# Patient Record
Sex: FEMALE | Race: WHITE | NOT HISPANIC OR LATINO | Employment: FULL TIME | URBAN - METROPOLITAN AREA
[De-identification: names, ages, dates, MRNs, and addresses within clinical notes are randomized per-mention and may not be internally consistent; named-entity substitution may affect disease eponyms.]

---

## 2017-04-23 LAB
FLUAV AG NPH QL: NEGATIVE
FLUBV AG NPH QL IA: NEGATIVE

## 2017-04-23 PROCEDURE — 87804 INFLUENZA ASSAY W/OPTIC: CPT | Performed by: EMERGENCY MEDICINE

## 2017-04-23 PROCEDURE — 99283 EMERGENCY DEPT VISIT LOW MDM: CPT

## 2017-04-24 ENCOUNTER — HOSPITAL ENCOUNTER (EMERGENCY)
Facility: HOSPITAL | Age: 27
Discharge: HOME | End: 2017-04-24
Attending: EMERGENCY MEDICINE | Admitting: EMERGENCY MEDICINE

## 2017-04-24 VITALS
DIASTOLIC BLOOD PRESSURE: 66 MMHG | RESPIRATION RATE: 16 BRPM | OXYGEN SATURATION: 99 % | TEMPERATURE: 98.3 F | BODY MASS INDEX: 42.33 KG/M2 | WEIGHT: 230 LBS | HEIGHT: 62 IN | SYSTOLIC BLOOD PRESSURE: 106 MMHG | HEART RATE: 77 BPM

## 2017-04-24 DIAGNOSIS — B34.9 VIRAL ILLNESS: Primary | ICD-10-CM

## 2017-04-24 LAB
ANION GAP SERPL CALCULATED.3IONS-SCNC: 12.7 MMOL/L
BASOPHILS # BLD AUTO: 0.03 10*3/MM3 (ref 0–0.2)
BASOPHILS NFR BLD AUTO: 0.3 % (ref 0–1.5)
BUN BLD-MCNC: 10 MG/DL (ref 6–20)
BUN/CREAT SERPL: 14.7 (ref 7–25)
CALCIUM SPEC-SCNC: 8.8 MG/DL (ref 8.6–10.5)
CHLORIDE SERPL-SCNC: 104 MMOL/L (ref 98–107)
CO2 SERPL-SCNC: 23.3 MMOL/L (ref 22–29)
CREAT BLD-MCNC: 0.68 MG/DL (ref 0.57–1)
DEPRECATED RDW RBC AUTO: 43 FL (ref 37–54)
EOSINOPHIL # BLD AUTO: 0.14 10*3/MM3 (ref 0–0.7)
EOSINOPHIL NFR BLD AUTO: 1.4 % (ref 0.3–6.2)
ERYTHROCYTE [DISTWIDTH] IN BLOOD BY AUTOMATED COUNT: 13.9 % (ref 11.7–13)
GFR SERPL CREATININE-BSD FRML MDRD: 106 ML/MIN/1.73
GLUCOSE BLD-MCNC: 90 MG/DL (ref 65–99)
HCT VFR BLD AUTO: 37.3 % (ref 35.6–45.5)
HGB BLD-MCNC: 12.3 G/DL (ref 11.9–15.5)
IMM GRANULOCYTES # BLD: 0.03 10*3/MM3 (ref 0–0.03)
IMM GRANULOCYTES NFR BLD: 0.3 % (ref 0–0.5)
LYMPHOCYTES # BLD AUTO: 3.76 10*3/MM3 (ref 0.9–4.8)
LYMPHOCYTES NFR BLD AUTO: 36.8 % (ref 19.6–45.3)
MCH RBC QN AUTO: 27.9 PG (ref 26.9–32)
MCHC RBC AUTO-ENTMCNC: 33 G/DL (ref 32.4–36.3)
MCV RBC AUTO: 84.6 FL (ref 80.5–98.2)
MONOCYTES # BLD AUTO: 0.66 10*3/MM3 (ref 0.2–1.2)
MONOCYTES NFR BLD AUTO: 6.5 % (ref 5–12)
NEUTROPHILS # BLD AUTO: 5.6 10*3/MM3 (ref 1.9–8.1)
NEUTROPHILS NFR BLD AUTO: 54.7 % (ref 42.7–76)
PLATELET # BLD AUTO: 347 10*3/MM3 (ref 140–500)
PMV BLD AUTO: 10.8 FL (ref 6–12)
POTASSIUM BLD-SCNC: 3.7 MMOL/L (ref 3.5–5.2)
RBC # BLD AUTO: 4.41 10*6/MM3 (ref 3.9–5.2)
SODIUM BLD-SCNC: 140 MMOL/L (ref 136–145)
WBC NRBC COR # BLD: 10.22 10*3/MM3 (ref 4.5–10.7)

## 2017-04-24 PROCEDURE — 80048 BASIC METABOLIC PNL TOTAL CA: CPT | Performed by: EMERGENCY MEDICINE

## 2017-04-24 PROCEDURE — 85025 COMPLETE CBC W/AUTO DIFF WBC: CPT | Performed by: EMERGENCY MEDICINE

## 2017-04-24 NOTE — ED PROVIDER NOTES
EMERGENCY DEPARTMENT ENCOUNTER    CHIEF COMPLAINT  Chief Complaint: generalized myalgias  History given by: patient, family  History limited by: nothing  Room Number: 22/22  PMD: RY Mcgee      HPI:  Pt is a 26 y.o. female who presents complaining of generalized myalgias and chills which began earlier tonight. Pt also complains of bilateral ear discomfort, low grade temperature (TMax=99 degrees), post-nasal drip, mild HA, sinus pressure and rhinorrhea but denies urinary symptoms, cough, abd pain or sore throat. Pt states that her coworker had strep throat and bronchitis recently. Pt denies any chance of pregnancy and states that she is on her menses at this time.    Duration:  Several hours  Onset: gradual  Timing: constant  Location: generalized  Radiation: none  Quality: myalgias  Intensity/Severity: moderate  Progression: worsening  Associated Symptoms: chills, bilateral ear discomfort, low grade temperature, post-nasal drip, HA, sinus pressure and rhinorrhea  Aggravating Factors: none  Alleviating Factors: none  Previous Episodes: none  Treatment before arrival: none    PAST MEDICAL HISTORY  Active Ambulatory Problems     Diagnosis Date Noted   • No Active Ambulatory Problems     Resolved Ambulatory Problems     Diagnosis Date Noted   • No Resolved Ambulatory Problems     Past Medical History:   Diagnosis Date   • Bulging lumbar disc    • UTI (urinary tract infection)        PAST SURGICAL HISTORY  Past Surgical History:   Procedure Laterality Date   • TONSILLECTOMY         FAMILY HISTORY  History reviewed. No pertinent family history.    SOCIAL HISTORY  Social History     Social History   • Marital status: Single     Spouse name: N/A   • Number of children: N/A   • Years of education: N/A     Occupational History   • Not on file.     Social History Main Topics   • Smoking status: Never Smoker   • Smokeless tobacco: Not on file   • Alcohol use No   • Drug use: No   • Sexual activity: Defer     Other  Topics Concern   • Not on file     Social History Narrative       ALLERGIES  Review of patient's allergies indicates no known allergies.    REVIEW OF SYSTEMS  Review of Systems   Constitutional: Positive for chills and fever (TMax=99 degrees).   HENT: Positive for ear pain (bilateral), postnasal drip, rhinorrhea and sinus pressure. Negative for sore throat.    Eyes: Negative.    Respiratory: Negative for cough and shortness of breath.    Cardiovascular: Negative for chest pain.   Gastrointestinal: Negative for abdominal pain, diarrhea and vomiting.   Genitourinary: Negative for dysuria.   Musculoskeletal: Positive for myalgias (generalized). Negative for neck pain.   Skin: Negative for rash.   Allergic/Immunologic: Negative.    Neurological: Positive for headaches (mild). Negative for weakness and numbness.   Hematological: Negative.    Psychiatric/Behavioral: Negative.    All other systems reviewed and are negative.      PHYSICAL EXAM  ED Triage Vitals   Temp Heart Rate Resp BP SpO2   04/23/17 2254 04/23/17 2254 04/23/17 2254 04/23/17 2304 04/23/17 2254   98.3 °F (36.8 °C) 102 16 139/91 98 %      Temp src Heart Rate Source Patient Position BP Location FiO2 (%)   04/23/17 2254 04/23/17 2254 04/23/17 2304 -- --   Tympanic Monitor Sitting         Physical Exam   Constitutional: She is oriented to person, place, and time and well-developed, well-nourished, and in no distress. She does not have a sickly appearance. No distress.   HENT:   Head: Normocephalic and atraumatic.   Right Ear: Tympanic membrane normal.   Left Ear: Tympanic membrane normal.   Nose: Rhinorrhea (clear drainage) present.   Mouth/Throat: Oropharynx is clear and moist. No posterior oropharyngeal erythema.   Eyes: EOM are normal. Pupils are equal, round, and reactive to light.   Neck: Normal range of motion. Neck supple.   Cardiovascular: Normal rate, regular rhythm and normal heart sounds.    Pulmonary/Chest: Effort normal and breath sounds normal.  No respiratory distress.   Abdominal: Soft. There is no tenderness. There is no rebound and no guarding.   obese   Musculoskeletal: Normal range of motion. She exhibits no edema.   Lymphadenopathy:     She has no cervical adenopathy.   Neurological: She is alert and oriented to person, place, and time. She has normal sensation and normal strength.   Skin: Skin is warm and dry. No rash noted.   Psychiatric: Mood and affect normal.   Nursing note and vitals reviewed.      LAB RESULTS  Lab Results (last 24 hours)     Procedure Component Value Units Date/Time    Influenza Antigen [[id number]]  (Normal) Collected:  04/23/17 2308    Specimen:  Swab from Nasopharynx Updated:  04/23/17 2349     Influenza A Ag, EIA Negative     Influenza B Ag, EIA Negative    CBC & Differential [[id number]] Collected:  04/24/17 0143    Specimen:  Blood Updated:  04/24/17 0158    Narrative:       The following orders were created for panel order CBC & Differential.  Procedure                               Abnormality         Status                     ---------                               -----------         ------                     CBC Auto Differential[[id number]]         Abnormal            Final result                 Please view results for these tests on the individual orders.    Basic Metabolic Panel [[id number]] Collected:  04/24/17 0143    Specimen:  Blood Updated:  04/24/17 0216     Glucose 90 mg/dL      BUN 10 mg/dL      Creatinine 0.68 mg/dL      Sodium 140 mmol/L      Potassium 3.7 mmol/L      Chloride 104 mmol/L      CO2 23.3 mmol/L      Calcium 8.8 mg/dL      eGFR Non African Amer 106 mL/min/1.73      BUN/Creatinine Ratio 14.7     Anion Gap 12.7 mmol/L     Narrative:       GFR Normal >60  Chronic Kidney Disease <60  Kidney Failure <15    CBC Auto Differential [[id number]]  (Abnormal) Collected:  04/24/17 0143    Specimen:  Blood Updated:  04/24/17 0158     WBC 10.22 10*3/mm3      RBC 4.41 10*6/mm3      Hemoglobin 12.3 g/dL       Hematocrit 37.3 %      MCV 84.6 fL      MCH 27.9 pg      MCHC 33.0 g/dL      RDW 13.9 (H) %      RDW-SD 43.0 fl      MPV 10.8 fL      Platelets 347 10*3/mm3      Neutrophil % 54.7 %      Lymphocyte % 36.8 %      Monocyte % 6.5 %      Eosinophil % 1.4 %      Basophil % 0.3 %      Immature Grans % 0.3 %      Neutrophils, Absolute 5.60 10*3/mm3      Lymphocytes, Absolute 3.76 10*3/mm3      Monocytes, Absolute 0.66 10*3/mm3      Eosinophils, Absolute 0.14 10*3/mm3      Basophils, Absolute 0.03 10*3/mm3      Immature Grans, Absolute 0.03 10*3/mm3           I ordered the above labs and reviewed the results    PROCEDURES  Procedures      PROGRESS AND CONSULTS  ED Course     0108- Notified pt and family of the pt's negative influenza screen. D/w pt and family that the pt most likely has a viral illness. Discussed the plan to order basic blood work prior to discharging the pt home. Pt and family agree with the plan and all questions were addressed.    0113- Ordered blood work for further evaluation.    0243- Rechecked pt. Pt is resting comfortably. Notified pt and family of the pt's unremarkable lab results. D/w pt and family that I believe that the pt has a viral illness and does not need abx at this time. Discussed the plan to discharge the pt home and RTER warning given for worsening symptoms. Pt and family agree with the plan and all questions were addressed.    2:47 AM  Latest vital signs   BP- 106/66 HR- 77 Temp- 98.3 °F (36.8 °C) (Tympanic) O2 sat- 99%    MEDICAL DECISION MAKING  Results were reviewed/discussed with the patient and they were also made aware of online access. Pt also made aware that follow up with PMD is necessary.     MDM  Number of Diagnoses or Management Options  Viral illness:      Amount and/or Complexity of Data Reviewed  Clinical lab tests: ordered and reviewed (Pt's influenza screen is negative. WBC=10.22)    Patient Progress  Patient progress: stable         DIAGNOSIS  Final diagnoses:   Viral  illness       DISPOSITION  DISCHARGE    Patient discharged in stable condition.    Reviewed implications of results, diagnosis, meds, responsibility to follow up, warning signs and symptoms of possible worsening, potential complications and reasons to return to ER, including fever, worsening pain or any concerns.    Patient/Family voiced understanding of above instructions.    Discussed plan for discharge, as there is no emergent indication for admission.  Pt/family is agreeable and understands need for follow up and repeat testing.  Pt is aware that discharge does not mean that nothing is wrong but it indicates no emergency is present that requires admission and they must continue care with follow-up as given below or physician of their choice.     FOLLOW-UP  Sammi Mike, APRN  [address]  [place] [id number] [phone]    Call  As needed, If symptoms worsen., Return if pain worsens, If symptoms worsen, shortness of breath, fever, any concerns    [facility] Emergency Department  [address] [facility] [address] [phone]  Go to  As needed, If symptoms worsen         Medication List      Stop          baclofen 10 MG tablet   Commonly known as:  LIORESAL       cephalexin 500 MG capsule   Commonly known as:  KEFLEX       chlorzoxazone 500 MG tablet   Commonly known as:  PARAFON FORTE       cyclobenzaprine 10 MG tablet   Commonly known as:  FLEXERIL       HYDROcodone-acetaminophen 5-325 MG per tablet   Commonly known as:  NORCO       ondansetron 4 MG tablet   Commonly known as:  ZOFRAN       predniSONE 50 MG tablet   Commonly known as:  DELTASONE               Latest Documented Vital Signs:  As of 2:49 AM  BP- 106/66 HR- 77 Temp- 98.3 °F (36.8 °C) (Tympanic) O2 sat- 99%    --  Documentation assistance provided by domingo Buchanan for Dr. Frank.  Information recorded by the domingo was done at my direction and has been verified and validated by me.      Paulina Buchanan  04/24/17 0249       Jaime Frank MD  04/24/17 2750

## 2017-04-24 NOTE — ED TRIAGE NOTES
Pt to ED for chills, body aches, earache onset this morning. Last dose of Ibuprofen 400mg at 9pm tonight

## 2018-07-03 ENCOUNTER — HOSPITAL ENCOUNTER (EMERGENCY)
Facility: HOSPITAL | Age: 28
Discharge: HOME | End: 2018-07-03
Attending: EMERGENCY MEDICINE | Admitting: EMERGENCY MEDICINE

## 2018-07-03 ENCOUNTER — APPOINTMENT (OUTPATIENT)
Dept: GENERAL RADIOLOGY | Facility: HOSPITAL | Age: 28
End: 2018-07-03

## 2018-07-03 VITALS
DIASTOLIC BLOOD PRESSURE: 80 MMHG | WEIGHT: 240 LBS | HEIGHT: 62 IN | HEART RATE: 76 BPM | BODY MASS INDEX: 44.16 KG/M2 | SYSTOLIC BLOOD PRESSURE: 122 MMHG | TEMPERATURE: 98.2 F | OXYGEN SATURATION: 99 % | RESPIRATION RATE: 18 BRPM

## 2018-07-03 DIAGNOSIS — S93.402A SPRAIN OF LEFT ANKLE, UNSPECIFIED LIGAMENT, INITIAL ENCOUNTER: Primary | ICD-10-CM

## 2018-07-03 PROCEDURE — 73610 X-RAY EXAM OF ANKLE: CPT

## 2018-07-03 PROCEDURE — 99283 EMERGENCY DEPT VISIT LOW MDM: CPT

## 2018-07-03 RX ORDER — HYDROCODONE BITARTRATE AND ACETAMINOPHEN 7.5; 325 MG/1; MG/1
1 TABLET ORAL ONCE
Status: COMPLETED | OUTPATIENT
Start: 2018-07-03 | End: 2018-07-03

## 2018-07-03 RX ADMIN — HYDROCODONE BITARTRATE AND ACETAMINOPHEN 1 TABLET: 7.5; 325 TABLET ORAL at 19:43

## 2018-07-03 NOTE — ED PROVIDER NOTES
" EMERGENCY DEPARTMENT ENCOUNTER    CHIEF COMPLAINT  Chief Complaint: L ankle pain  History given by: Pt  History limited by: nothing  Room Number: 34/34  PMD: RY Sim      HPI:  Pt is a 28 y.o. female who presents complaining of L ankle pain which began PTA when pt \"rolled\" her ankle. Pt states that she felt a pop when she accidentally fell down two steps. Pt also complains of abrasions to R knee denies a blow to the head, hip pain, and numbness or tingling. Pt is able to ambulate but states that she is having trouble bearing weight.     Duration:  PTA  Onset: sudden  Timing: constant  Location: L ankle  Radiation: none  Quality: pain  Intensity/Severity: mild  Progression: unchanged  Associated Symptoms: abrasions  Aggravating Factors: bearing weight  Alleviating Factors: none  Previous Episodes: none  Treatment before arrival: nothing    PAST MEDICAL HISTORY  Active Ambulatory Problems     Diagnosis Date Noted   • No Active Ambulatory Problems     Resolved Ambulatory Problems     Diagnosis Date Noted   • No Resolved Ambulatory Problems     Past Medical History:   Diagnosis Date   • Bulging lumbar disc    • UTI (urinary tract infection)        PAST SURGICAL HISTORY  Past Surgical History:   Procedure Laterality Date   • TONSILLECTOMY         FAMILY HISTORY  History reviewed. No pertinent family history.    SOCIAL HISTORY  Social History     Social History   • Marital status: Single     Spouse name: N/A   • Number of children: N/A   • Years of education: N/A     Occupational History   • Not on file.     Social History Main Topics   • Smoking status: Never Smoker   • Smokeless tobacco: Not on file   • Alcohol use No   • Drug use: No   • Sexual activity: Defer     Other Topics Concern   • Not on file     Social History Narrative   • No narrative on file       ALLERGIES  Patient has no known allergies.    REVIEW OF SYSTEMS  Review of Systems   Constitutional: Negative for fever.   Respiratory: Negative " for shortness of breath.    Cardiovascular: Negative for chest pain.   Musculoskeletal: Positive for arthralgias (L ankle).   Skin: Positive for wound (abrasions to R knee).   Neurological: Negative for numbness.       PHYSICAL EXAM  ED Triage Vitals [07/03/18 1902]   Temp Heart Rate Resp BP SpO2   97.2 °F (36.2 °C) 106 20 -- 98 %      Temp src Heart Rate Source Patient Position BP Location FiO2 (%)   Tympanic Monitor -- -- --       Physical Exam   Constitutional: She is oriented to person, place, and time. No distress.   Eyes: EOM are normal.   Neck: Normal range of motion.   Cardiovascular: Normal rate and regular rhythm.    Pulses:       Dorsalis pedis pulses are 2+ on the right side, and 2+ on the left side.   Pulmonary/Chest: Effort normal and breath sounds normal. No respiratory distress.   Musculoskeletal:        Right knee: She exhibits no bony tenderness.        Left ankle: She exhibits swelling (mild, lateral). She exhibits no deformity and normal pulse. Tenderness (mild, lateral).        Right foot: There is no bony tenderness and normal capillary refill.        Left foot: There is normal capillary refill.   Neurological: She is alert and oriented to person, place, and time. She has normal sensation and normal strength.   Skin: Skin is warm and dry. Abrasion (superficial to R knee, R second toe) noted.   Psychiatric: Affect normal.   Nursing note and vitals reviewed.        RADIOLOGY  XR Ankle 3+ View Left   Final Result       Soft tissue swelling. No acute fracture identified. If there is clinical   suspicion for radiographically occult fracture, or to further evaluate   the soft tissues, MRI could be considered.       This report was finalized on 7/3/2018 7:27 PM by Dr. Luke Woodson M.D.               I ordered the above noted radiological studies. Interpreted by radiologist. Reviewed by me in PACS.       PROCEDURES  Procedures  Splint Application:  Splint Type: L ankle aircast  Indication: L  ankle sprain  Splint placed by ERT  Post splint application:   1) neurovascularly intact   2) good position  Discussed splint care with patient  Discussed PMD/orthopedic follow up      PROGRESS AND CONSULTS     1910: Notified pt of plan to order imaging for further evaluation. Pt understands and agrees with the plan, all questions answered.    1912: Ordered XR L ankle for further evaluation.     1935: Pt rechecked. Pt resting comfortably and in no acute distress. Notified pt of unremarkable imaging results and possible strain. Notified pt of plan to have ERT place an aircast and give pt crutches for comfort. Instructed pt to f/u with ortho as needed. Pt understands and agrees with the plan, all questions answered.    MEDICAL DECISION MAKING  Results were reviewed/discussed with the patient and they were also made aware of online access. Pt also made aware that some labs, such as cultures, will not be resulted during ER visit and follow up with PMD is necessary.     MDM  Number of Diagnoses or Management Options     Amount and/or Complexity of Data Reviewed  Tests in the radiology section of CPT®: ordered and reviewed (XR L ankle is negative.)  Decide to obtain previous medical records or to obtain history from someone other than the patient: yes    Patient Progress  Patient progress: stable         DIAGNOSIS  Final diagnoses:   Sprain of left ankle, unspecified ligament, initial encounter       DISPOSITION  DISCHARGE    Patient discharged in stable condition.    Reviewed implications of results, diagnosis, meds, responsibility to follow up, warning signs and symptoms of possible worsening, potential complications and reasons to return to ER.    Patient/Family voiced understanding of above instructions.    Discussed plan for discharge, as there is no emergent indication for admission. Patient referred to primary care provider for BP management due to today's BP. Pt/family is agreeable and understands need for follow  up and repeat testing.  Pt is aware that discharge does not mean that nothing is wrong but it indicates no emergency is present that requires admission and they must continue care with follow-up as given below or physician of their choice.     FOLLOW-UP  Rin Flores MD  [address]  [place] [id number]  [phone]    Call in 1 week  If symptoms persist         Medication List      No changes were made to your prescriptions during this visit.       Latest Documented Vital Signs:  As of 7:32 PM  BP- 118/85 HR- 106 Temp- 97.2 °F (36.2 °C) (Tympanic) O2 sat- 98%    --    Documentation assistance provided by domingo Weinstein for Dr. Landis.  Information recorded by the scribe was done at my direction and has been verified and validated by me.     Abbi Weinstein  07/03/18 0056       Josue Landis MD  07/03/18 5835

## 2018-07-03 NOTE — DISCHARGE INSTRUCTIONS
Wear Aircast and use crutches for the next 4-5 days.  Elevate your ankle.  Apply ice as needed.  Take Tylenol, Naprosyn, or ibuprofen as needed.  Follow-up with Dr. Flores in 1 week if symptoms are not improving.

## 2018-07-03 NOTE — ED TRIAGE NOTES
Pt states that about 20 minutes ago she rolled her left ankle.   States that she is having trouble putting weight on it.

## 2019-09-18 ENCOUNTER — HOSPITAL ENCOUNTER (EMERGENCY)
Facility: HOSPITAL | Age: 29
Discharge: HOME | End: 2019-09-19
Attending: EMERGENCY MEDICINE | Admitting: EMERGENCY MEDICINE

## 2019-09-18 DIAGNOSIS — N39.0 UTI (URINARY TRACT INFECTION), UNCOMPLICATED: ICD-10-CM

## 2019-09-18 DIAGNOSIS — R11.2 NAUSEA, VOMITING AND DIARRHEA: ICD-10-CM

## 2019-09-18 DIAGNOSIS — R19.7 NAUSEA, VOMITING AND DIARRHEA: ICD-10-CM

## 2019-09-18 DIAGNOSIS — R10.31 RIGHT LOWER QUADRANT ABDOMINAL PAIN: Primary | ICD-10-CM

## 2019-09-18 PROCEDURE — 80053 COMPREHEN METABOLIC PANEL: CPT | Performed by: EMERGENCY MEDICINE

## 2019-09-18 PROCEDURE — 84703 CHORIONIC GONADOTROPIN ASSAY: CPT | Performed by: EMERGENCY MEDICINE

## 2019-09-18 PROCEDURE — 85025 COMPLETE CBC W/AUTO DIFF WBC: CPT | Performed by: EMERGENCY MEDICINE

## 2019-09-18 PROCEDURE — 99283 EMERGENCY DEPT VISIT LOW MDM: CPT

## 2019-09-18 PROCEDURE — 83690 ASSAY OF LIPASE: CPT | Performed by: EMERGENCY MEDICINE

## 2019-09-18 PROCEDURE — 81001 URINALYSIS AUTO W/SCOPE: CPT | Performed by: EMERGENCY MEDICINE

## 2019-09-18 PROCEDURE — 96374 THER/PROPH/DIAG INJ IV PUSH: CPT

## 2019-09-18 RX ORDER — SODIUM CHLORIDE 0.9 % (FLUSH) 0.9 %
10 SYRINGE (ML) INJECTION AS NEEDED
Status: DISCONTINUED | OUTPATIENT
Start: 2019-09-18 | End: 2019-09-19 | Stop reason: HOSPADM

## 2019-09-19 ENCOUNTER — APPOINTMENT (OUTPATIENT)
Dept: CT IMAGING | Facility: HOSPITAL | Age: 29
End: 2019-09-19

## 2019-09-19 VITALS
RESPIRATION RATE: 16 BRPM | OXYGEN SATURATION: 92 % | BODY MASS INDEX: 46.19 KG/M2 | SYSTOLIC BLOOD PRESSURE: 139 MMHG | WEIGHT: 251 LBS | TEMPERATURE: 97.4 F | HEART RATE: 67 BPM | DIASTOLIC BLOOD PRESSURE: 87 MMHG | HEIGHT: 62 IN

## 2019-09-19 LAB
ALBUMIN SERPL-MCNC: 4.3 G/DL (ref 3.5–5.2)
ALBUMIN/GLOB SERPL: 1.1 G/DL
ALP SERPL-CCNC: 100 U/L (ref 39–117)
ALT SERPL W P-5'-P-CCNC: 9 U/L (ref 1–33)
ANION GAP SERPL CALCULATED.3IONS-SCNC: 14.2 MMOL/L (ref 5–15)
AST SERPL-CCNC: 19 U/L (ref 1–32)
BACTERIA UR QL AUTO: ABNORMAL /HPF
BASOPHILS # BLD AUTO: 0.05 10*3/MM3 (ref 0–0.2)
BASOPHILS NFR BLD AUTO: 0.4 % (ref 0–1.5)
BILIRUB SERPL-MCNC: 0.3 MG/DL (ref 0.2–1.2)
BILIRUB UR QL STRIP: NEGATIVE
BUN BLD-MCNC: 7 MG/DL (ref 6–20)
BUN/CREAT SERPL: 8.5 (ref 7–25)
CALCIUM SPEC-SCNC: 9.4 MG/DL (ref 8.6–10.5)
CHLORIDE SERPL-SCNC: 104 MMOL/L (ref 98–107)
CLARITY UR: CLEAR
CO2 SERPL-SCNC: 22.8 MMOL/L (ref 22–29)
COLOR UR: YELLOW
CREAT BLD-MCNC: 0.82 MG/DL (ref 0.57–1)
DEPRECATED RDW RBC AUTO: 43.1 FL (ref 37–54)
EOSINOPHIL # BLD AUTO: 0.04 10*3/MM3 (ref 0–0.4)
EOSINOPHIL NFR BLD AUTO: 0.3 % (ref 0.3–6.2)
ERYTHROCYTE [DISTWIDTH] IN BLOOD BY AUTOMATED COUNT: 14.2 % (ref 12.3–15.4)
GFR SERPL CREATININE-BSD FRML MDRD: 84 ML/MIN/1.73
GLOBULIN UR ELPH-MCNC: 3.8 GM/DL
GLUCOSE BLD-MCNC: 97 MG/DL (ref 65–99)
GLUCOSE UR STRIP-MCNC: NEGATIVE MG/DL
HCG SERPL QL: NEGATIVE
HCT VFR BLD AUTO: 41.6 % (ref 34–46.6)
HGB BLD-MCNC: 13.2 G/DL (ref 12–15.9)
HGB UR QL STRIP.AUTO: ABNORMAL
HYALINE CASTS UR QL AUTO: ABNORMAL /LPF
IMM GRANULOCYTES # BLD AUTO: 0.04 10*3/MM3 (ref 0–0.05)
IMM GRANULOCYTES NFR BLD AUTO: 0.3 % (ref 0–0.5)
KETONES UR QL STRIP: ABNORMAL
LEUKOCYTE ESTERASE UR QL STRIP.AUTO: NEGATIVE
LIPASE SERPL-CCNC: 13 U/L (ref 13–60)
LYMPHOCYTES # BLD AUTO: 3.1 10*3/MM3 (ref 0.7–3.1)
LYMPHOCYTES NFR BLD AUTO: 22.8 % (ref 19.6–45.3)
MCH RBC QN AUTO: 26.6 PG (ref 26.6–33)
MCHC RBC AUTO-ENTMCNC: 31.7 G/DL (ref 31.5–35.7)
MCV RBC AUTO: 83.7 FL (ref 79–97)
MONOCYTES # BLD AUTO: 0.59 10*3/MM3 (ref 0.1–0.9)
MONOCYTES NFR BLD AUTO: 4.3 % (ref 5–12)
NEUTROPHILS # BLD AUTO: 9.8 10*3/MM3 (ref 1.7–7)
NEUTROPHILS NFR BLD AUTO: 71.9 % (ref 42.7–76)
NITRITE UR QL STRIP: NEGATIVE
NRBC BLD AUTO-RTO: 0 /100 WBC (ref 0–0.2)
PH UR STRIP.AUTO: <=5 [PH] (ref 5–8)
PLATELET # BLD AUTO: 419 10*3/MM3 (ref 140–450)
PMV BLD AUTO: 11.5 FL (ref 6–12)
POTASSIUM BLD-SCNC: 3.9 MMOL/L (ref 3.5–5.2)
PROT SERPL-MCNC: 8.1 G/DL (ref 6–8.5)
PROT UR QL STRIP: NEGATIVE
RBC # BLD AUTO: 4.97 10*6/MM3 (ref 3.77–5.28)
RBC # UR: ABNORMAL /HPF
REF LAB TEST METHOD: ABNORMAL
SODIUM BLD-SCNC: 141 MMOL/L (ref 136–145)
SP GR UR STRIP: 1.02 (ref 1–1.03)
SQUAMOUS #/AREA URNS HPF: ABNORMAL /HPF
UROBILINOGEN UR QL STRIP: ABNORMAL
WBC NRBC COR # BLD: 13.62 10*3/MM3 (ref 3.4–10.8)
WBC UR QL AUTO: ABNORMAL /HPF

## 2019-09-19 PROCEDURE — 25010000002 IOPAMIDOL 61 % SOLUTION: Performed by: EMERGENCY MEDICINE

## 2019-09-19 PROCEDURE — 25010000002 ONDANSETRON PER 1 MG: Performed by: EMERGENCY MEDICINE

## 2019-09-19 PROCEDURE — 74177 CT ABD & PELVIS W/CONTRAST: CPT

## 2019-09-19 PROCEDURE — 96374 THER/PROPH/DIAG INJ IV PUSH: CPT

## 2019-09-19 RX ORDER — ONDANSETRON 8 MG/1
8 TABLET, ORALLY DISINTEGRATING ORAL EVERY 8 HOURS PRN
Qty: 12 TABLET | Refills: 0 | Status: SHIPPED | OUTPATIENT
Start: 2019-09-19

## 2019-09-19 RX ORDER — SULFAMETHOXAZOLE AND TRIMETHOPRIM 800; 160 MG/1; MG/1
1 TABLET ORAL 2 TIMES DAILY
Qty: 10 TABLET | Refills: 0 | Status: SHIPPED | OUTPATIENT
Start: 2019-09-19

## 2019-09-19 RX ORDER — ONDANSETRON 2 MG/ML
4 INJECTION INTRAMUSCULAR; INTRAVENOUS ONCE
Status: COMPLETED | OUTPATIENT
Start: 2019-09-19 | End: 2019-09-19

## 2019-09-19 RX ADMIN — ONDANSETRON 4 MG: 2 INJECTION INTRAMUSCULAR; INTRAVENOUS at 00:05

## 2019-09-19 RX ADMIN — SODIUM CHLORIDE 1000 ML: 9 INJECTION, SOLUTION INTRAVENOUS at 00:05

## 2019-09-19 RX ADMIN — IOPAMIDOL 85 ML: 612 INJECTION, SOLUTION INTRAVENOUS at 01:06

## 2019-09-19 NOTE — ED TRIAGE NOTES
Pt to ED from [facility] with reports of RLQ pain since Monday.  Pt still has appendix.  Pt denies fevers, but reports nausea/vomiting/diarrhea.

## 2019-09-19 NOTE — ED PROVIDER NOTES
EMERGENCY DEPARTMENT ENCOUNTER    CHIEF COMPLAINT  Chief Complaint: Abdominal Pain   History given by: Patient   History limited by: none   Room Number: 16/16  PMD: Lindsey Chavez APRN      HPI:  Pt is a 29 y.o. female who presents complaining of periumbilical abd pain radiating to RLQ for the past 3 days. Pt confirms N/V/D and loss of appetite since onset, but denies fever or blood in stool. Per pt, she was seen at [facility] and referred to ED for appendicitis rule out. Pt states she is supposed to start menstrual cycle in several days. Per pt, she has no hx of abd surgeries or significant medical hx.     Duration:  3 days   Onset: gradual   Timing: constant   Location: periumbilical abd   Radiation: to RLQ   Quality: pain   Intensity/Severity: mild to moderate   Progression: unchanged   Associated Symptoms: N/V/D and loss off appetite   Aggravating Factors: none   Alleviating Factors: none   Previous Episodes: none   Treatment before arrival: none     PAST MEDICAL HISTORY  Active Ambulatory Problems     Diagnosis Date Noted   • No Active Ambulatory Problems     Resolved Ambulatory Problems     Diagnosis Date Noted   • No Resolved Ambulatory Problems     Past Medical History:   Diagnosis Date   • Bulging lumbar disc    • UTI (urinary tract infection)        PAST SURGICAL HISTORY  Past Surgical History:   Procedure Laterality Date   • TONSILLECTOMY         FAMILY HISTORY  No family history on file.    SOCIAL HISTORY  Social History     Socioeconomic History   • Marital status:      Spouse name: Not on file   • Number of children: Not on file   • Years of education: Not on file   • Highest education level: Not on file   Tobacco Use   • Smoking status: Never Smoker   Substance and Sexual Activity   • Alcohol use: No   • Drug use: No   • Sexual activity: Defer       ALLERGIES  Patient has no known allergies.    REVIEW OF SYSTEMS  Review of Systems   Constitutional: Positive for appetite change (loss of). Negative  for fever.   HENT: Negative for sore throat.    Eyes: Negative.    Respiratory: Negative for cough and shortness of breath.    Cardiovascular: Negative for chest pain.   Gastrointestinal: Positive for abdominal pain (periumbilical to RLQ), diarrhea, nausea and vomiting. Negative for blood in stool.   Genitourinary: Negative for dysuria.   Musculoskeletal: Negative for neck pain.   Skin: Negative for rash.   Allergic/Immunologic: Negative.    Neurological: Negative for weakness, numbness and headaches.   Hematological: Negative.    Psychiatric/Behavioral: Negative.    All other systems reviewed and are negative.      PHYSICAL EXAM  ED Triage Vitals [09/18/19 2329]   Temp Heart Rate Resp BP SpO2   97.4 °F (36.3 °C) 100 18 -- 97 %      Temp src Heart Rate Source Patient Position BP Location FiO2 (%)   Tympanic Monitor -- -- --       Physical Exam   Constitutional: She is oriented to person, place, and time. No distress.   HENT:   Head: Normocephalic and atraumatic.   Eyes: EOM are normal. Pupils are equal, round, and reactive to light.   Neck: Normal range of motion. Neck supple.   Cardiovascular: Normal rate, regular rhythm and normal heart sounds.   Pulmonary/Chest: Effort normal and breath sounds normal. No respiratory distress.   Abdominal: Soft. There is tenderness in the right lower quadrant. There is no rebound and no guarding.   Musculoskeletal: Normal range of motion. She exhibits no edema.   Neurological: She is alert and oriented to person, place, and time. She has normal sensation and normal strength.   Skin: Skin is warm and dry. No rash noted.   Psychiatric: Mood and affect normal.   Nursing note and vitals reviewed.      LAB RESULTS  Lab Results (last 24 hours)     Procedure Component Value Units Date/Time    CBC & Differential [[id number]] Collected:  09/18/19 4214    Specimen:  Blood Updated:  09/19/19 0002    Narrative:       The following orders were created for panel order CBC &  Differential.  Procedure                               Abnormality         Status                     ---------                               -----------         ------                     CBC Auto Differential[[id number]]        Abnormal            Final result                 Please view results for these tests on the individual orders.    Comprehensive Metabolic Panel [[id number]] Collected:  09/18/19 2354    Specimen:  Blood Updated:  09/19/19 0031     Glucose 97 mg/dL      BUN 7 mg/dL      Creatinine 0.82 mg/dL      Sodium 141 mmol/L      Potassium 3.9 mmol/L      Chloride 104 mmol/L      CO2 22.8 mmol/L      Calcium 9.4 mg/dL      Total Protein 8.1 g/dL      Albumin 4.30 g/dL      ALT (SGPT) 9 U/L      AST (SGOT) 19 U/L      Comment: Specimen hemolyzed.  Results may be affected.        Alkaline Phosphatase 100 U/L      Total Bilirubin 0.3 mg/dL      eGFR Non African Amer 84 mL/min/1.73      Globulin 3.8 gm/dL      A/G Ratio 1.1 g/dL      BUN/Creatinine Ratio 8.5     Anion Gap 14.2 mmol/L     Narrative:       GFR Normal >60  Chronic Kidney Disease <60  Kidney Failure <15    Lipase [[id number]]  (Normal) Collected:  09/18/19 2354    Specimen:  Blood Updated:  09/19/19 0027     Lipase 13 U/L     hCG, Serum, Qualitative [[id number]]  (Normal) Collected:  09/18/19 2354    Specimen:  Blood Updated:  09/19/19 0026     HCG Qualitative Negative    Urinalysis With Microscopic If Indicated (No Culture) - Urine, Clean Catch [[id number]]  (Abnormal) Collected:  09/18/19 2354    Specimen:  Urine, Clean Catch Updated:  09/19/19 0004     Color, UA Yellow     Appearance, UA Clear     pH, UA <=5.0     Specific Gravity, UA 1.018     Glucose, UA Negative     Ketones, UA Trace     Bilirubin, UA Negative     Blood, UA Small (1+)     Protein, UA Negative     Leuk Esterase, UA Negative     Nitrite, UA Negative     Urobilinogen, UA 0.2 E.U./dL    CBC Auto Differential [[id number]]  (Abnormal) Collected:  09/18/19 2354    Specimen:  Blood  Updated:  09/19/19 0002     WBC 13.62 10*3/mm3      RBC 4.97 10*6/mm3      Hemoglobin 13.2 g/dL      Hematocrit 41.6 %      MCV 83.7 fL      MCH 26.6 pg      MCHC 31.7 g/dL      RDW 14.2 %      RDW-SD 43.1 fl      MPV 11.5 fL      Platelets 419 10*3/mm3      Neutrophil % 71.9 %      Lymphocyte % 22.8 %      Monocyte % 4.3 %      Eosinophil % 0.3 %      Basophil % 0.4 %      Immature Grans % 0.3 %      Neutrophils, Absolute 9.80 10*3/mm3      Lymphocytes, Absolute 3.10 10*3/mm3      Monocytes, Absolute 0.59 10*3/mm3      Eosinophils, Absolute 0.04 10*3/mm3      Basophils, Absolute 0.05 10*3/mm3      Immature Grans, Absolute 0.04 10*3/mm3      nRBC 0.0 /100 WBC     Urinalysis, Microscopic Only - Urine, Clean Catch [[id number]]  (Abnormal) Collected:  09/18/19 2354    Specimen:  Urine, Clean Catch Updated:  09/19/19 0014     RBC, UA 6-12 /HPF      WBC, UA 6-12 /HPF      Bacteria, UA 2+ /HPF      Squamous Epithelial Cells, UA 7-12 /HPF      Hyaline Casts, UA 13-20 /LPF      Methodology Manual Light Microscopy          I ordered the above labs and reviewed the results    RADIOLOGY  CT Abdomen Pelvis With Contrast   Final Result   IMPRESSION :    1. Some mild heterogeneity of the bilateral nephrograms which can be   seen with pyelonephritis.    2. Mild diverticulosis           This report was finalized on 9/19/2019 1:20 AM by Jhon Bruner M.D.               I ordered the above noted radiological studies. Interpreted by radiologist. Reviewed by me in PACS.       PROCEDURES  Procedures      PROGRESS AND CONSULTS     2356: Upon pt exam, discussed plan for lab workup and CT scan in ED for further evaluation. Offered patient pain medication and antiemetics at this time. Pt declined offer of pain medication.     0003: Labs and CT Abd/Pelvis ordered for further evaluation. Zofran ordered for nausea.     0200: Pt rechecked and resting comfortably. Discussed results of lab workup and CT scan with normal appearing appendix.  Informed pt of plan for discharge with abx and antiemetics for treatment of possible gastroenteritis and UTI. 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 Pt directed to follow up as needed. Pt understands and agrees with the plan, all questions answered.      MEDICAL DECISION MAKING  Results were reviewed/discussed with the patient and they were also made aware of online access. Pt also made aware that some labs, such as cultures, will not be resulted during ER visit and follow up with PMD is necessary.     MDM  Number of Diagnoses or Management Options  Nausea, vomiting and diarrhea:   Right lower quadrant abdominal pain:   UTI (urinary tract infection), uncomplicated:      Amount and/or Complexity of Data Reviewed  Clinical lab tests: ordered and reviewed (UA: WBC - 6-12  Bacteria - 2+)  Tests in the radiology section of CPT®: ordered and reviewed (CT - mild diverticulosis )  Review and summarize past medical records: yes (Pt seen at  and referred to ED for rule out of appendicitis )           DIAGNOSIS  Final diagnoses:   Right lower quadrant abdominal pain   Nausea, vomiting and diarrhea   UTI (urinary tract infection), uncomplicated       DISPOSITION  DISCHARGE    Patient discharged in stable condition.    Reviewed implications of results, diagnosis, meds, responsibility to follow up, warning signs and symptoms of possible worsening, potential complications and reasons to return to ER.    Patient/Family voiced understanding of above instructions.    Discussed plan for discharge, as there is no emergent indication for admission. Patient referred to primary care provider for BP management due to today's BP. Pt/family is agreeable and understands need for follow up and repeat testing.  Pt is aware that discharge does not mean that nothing is wrong but it indicates no emergency is present that requires admission and they must continue care with follow-up as given below  or physician of their choice.     FOLLOW-UP  Lindsey Chavez, APRN  [address]  [place] [id number] [phone]    Schedule an appointment as soon as possible for a visit            Medication List      New Prescriptions    ondansetron ODT 8 MG disintegrating tablet  Commonly known as:  ZOFRAN ODT  Take 1 tablet by mouth Every 8 (Eight) Hours As Needed for Nausea or   Vomiting.     sulfamethoxazole-trimethoprim 800-160 MG per tablet  Commonly known as:  BACTRIM DS  Take 1 tablet by mouth 2 (Two) Times a Day.              Latest Documented Vital Signs:  As of 6:36 AM  BP- 139/87 HR- 67 Temp- 97.4 °F (36.3 °C) (Tympanic) O2 sat- 92%    --  Documentation assistance provided by domingo Cuevas for Dr. Ojeda.  Information recorded by the scribe was done at my direction and has been verified and validated by me.              Domitila Cuevas  09/19/19 0303       Marquise Ojeda MD  09/19/19 0188

## 2022-06-10 ENCOUNTER — APPOINTMENT (OUTPATIENT)
Dept: GENERAL RADIOLOGY | Facility: HOSPITAL | Age: 32
End: 2022-06-10

## 2022-06-10 ENCOUNTER — HOSPITAL ENCOUNTER (EMERGENCY)
Facility: HOSPITAL | Age: 32
Discharge: HOME | End: 2022-06-10
Attending: EMERGENCY MEDICINE | Admitting: EMERGENCY MEDICINE

## 2022-06-10 VITALS
OXYGEN SATURATION: 98 % | TEMPERATURE: 98.2 F | HEIGHT: 62 IN | BODY MASS INDEX: 49.69 KG/M2 | RESPIRATION RATE: 16 BRPM | DIASTOLIC BLOOD PRESSURE: 104 MMHG | HEART RATE: 66 BPM | WEIGHT: 270 LBS | SYSTOLIC BLOOD PRESSURE: 137 MMHG

## 2022-06-10 DIAGNOSIS — S39.012A STRAIN OF LUMBAR REGION, INITIAL ENCOUNTER: Primary | ICD-10-CM

## 2022-06-10 PROCEDURE — 96372 THER/PROPH/DIAG INJ SC/IM: CPT

## 2022-06-10 PROCEDURE — 25010000002 KETOROLAC TROMETHAMINE PER 15 MG: Performed by: EMERGENCY MEDICINE

## 2022-06-10 PROCEDURE — 99283 EMERGENCY DEPT VISIT LOW MDM: CPT

## 2022-06-10 PROCEDURE — 72110 X-RAY EXAM L-2 SPINE 4/>VWS: CPT

## 2022-06-10 RX ORDER — KETOROLAC TROMETHAMINE 30 MG/ML
30 INJECTION, SOLUTION INTRAMUSCULAR; INTRAVENOUS ONCE
Status: COMPLETED | OUTPATIENT
Start: 2022-06-10 | End: 2022-06-10

## 2022-06-10 RX ORDER — PREDNISONE 20 MG/1
40 TABLET ORAL DAILY
Qty: 8 TABLET | Refills: 0 | Status: SHIPPED | OUTPATIENT
Start: 2022-06-10

## 2022-06-10 RX ORDER — IBUPROFEN 600 MG/1
600 TABLET ORAL EVERY 8 HOURS PRN
Qty: 15 TABLET | Refills: 0 | Status: SHIPPED | OUTPATIENT
Start: 2022-06-10

## 2022-06-10 RX ADMIN — KETOROLAC TROMETHAMINE 30 MG: 30 INJECTION, SOLUTION INTRAMUSCULAR; INTRAVENOUS at 14:19

## 2022-06-10 NOTE — ED PROVIDER NOTES
EMERGENCY DEPARTMENT ENCOUNTER    Room Number:  34/34  PCP: Lindsey Chavez APRN  Historian: Patient  History Limited By: Nothing      HPI  Chief Complaint: Low back pain  Context: Krys Brown is a 31 y.o. female who presents to the ED c/o low back pain.  Patient has had a history of low back issues in the past.  Patient states she was bending over a table and had pain in her lower back.  Pain does not go down her leg.  Has had no bowel or bladder issues.  Has had no direct trauma.  Has had no vomiting or diarrhea.  Patient is currently breast-feeding.  Has had no rash.      Location: Right lower back  Radiation: None  Character: Aching  Duration: Noon  Severity: Moderate  Progression: Not improved  Aggravating Factors: Movement  Alleviating Factors: Remaining still        MEDICAL RECORD REVIEW    Patient had            PAST MEDICAL HISTORY  Active Ambulatory Problems     Diagnosis Date Noted   • No Active Ambulatory Problems     Resolved Ambulatory Problems     Diagnosis Date Noted   • No Resolved Ambulatory Problems     Past Medical History:   Diagnosis Date   • Bulging lumbar disc    • UTI (urinary tract infection)          PAST SURGICAL HISTORY  Past Surgical History:   Procedure Laterality Date   • TONSILLECTOMY           FAMILY HISTORY  No family history on file.      SOCIAL HISTORY  Social History     Socioeconomic History   • Marital status:    Tobacco Use   • Smoking status: Never Smoker   Substance and Sexual Activity   • Alcohol use: No   • Drug use: No   • Sexual activity: Defer         ALLERGIES  Patient has no known allergies.        REVIEW OF SYSTEMS  Review of Systems   Constitutional: Negative for fever.   HENT: Negative for sore throat.    Eyes: Negative.    Respiratory: Negative for cough and shortness of breath.    Cardiovascular: Negative for chest pain.   Gastrointestinal: Negative for abdominal pain, diarrhea and vomiting.   Genitourinary: Negative for  dysuria.   Musculoskeletal: Positive for back pain. Negative for neck pain.   Skin: Negative for rash.   Allergic/Immunologic: Negative.    Neurological: Negative for weakness, numbness and headaches.   Hematological: Negative.    Psychiatric/Behavioral: Negative.    All other systems reviewed and are negative.           PHYSICAL EXAM  ED Triage Vitals [06/10/22 1327]   Temp Heart Rate Resp BP SpO2   98.2 °F (36.8 °C) 85 16 -- 98 %      Temp src Heart Rate Source Patient Position BP Location FiO2 (%)   Tympanic Monitor -- -- --       Physical Exam  Vitals and nursing note reviewed.   Constitutional:       General: She is not in acute distress.  HENT:      Head: Normocephalic and atraumatic.   Eyes:      Pupils: Pupils are equal, round, and reactive to light.   Cardiovascular:      Rate and Rhythm: Normal rate and regular rhythm.      Heart sounds: Normal heart sounds.   Pulmonary:      Effort: Pulmonary effort is normal. No respiratory distress.      Breath sounds: Normal breath sounds.   Abdominal:      Palpations: Abdomen is soft.      Tenderness: There is no abdominal tenderness. There is no guarding or rebound.   Musculoskeletal:         General: Normal range of motion.      Cervical back: Normal range of motion and neck supple.      Comments: Mild right lower back tenderness   Skin:     General: Skin is warm and dry.      Findings: No rash.   Neurological:      Mental Status: She is alert and oriented to person, place, and time.      Sensory: Sensation is intact. No sensory deficit.      Motor: No weakness.   Psychiatric:         Mood and Affect: Mood and affect normal.       Patient was wearing a face mask when I entered the room and they continued to wear a mask throughout their stay in the ED.  I wore PPE, including  gloves, face mask with shield or face mask with goggles whenever I was in the room with patient.       LAB RESULTS  No results found for this or any previous visit (from the past 24  hour(s)).    Ordered the above labs and reviewed the results.        RADIOLOGY  XR Spine Lumbar Complete 4+VW   Final Result           Ordered the above noted radiological studies. Reviewed by me in PACS.          PROCEDURES  Procedures              MEDICATIONS GIVEN IN ER  Medications   ketorolac (TORADOL) injection 30 mg (30 mg Intramuscular Given 6/10/22 1419)             PROGRESS AND CONSULTS  ED Course as of 06/10/22 1546   Fri Dany 10, 2022   1523 15:23 EDT  Patient presents for right-sided back pain after leaning over.  Patient has no radicular symptoms so doubt sciatica.  Most likely musculoskeletal strain.  X-ray shows no evidence of fracture.  Patient is breast-feeding so it makes treatment a little bit more difficult.  We will give anti-inflammatories and steroids.  Does not want to stop breast-feeding so will not give muscle relaxers or pain medication.  Instructed to follow-up with primary doctor. [SL]      ED Course User Index  [SL] Chirag Waggoner MD           MEDICAL DECISION MAKING      MDM  Number of Diagnoses or Management Options     Amount and/or Complexity of Data Reviewed  Tests in the radiology section of CPT®: reviewed and ordered (X-ray negative)               DIAGNOSIS  Final diagnoses:   Strain of lumbar region, initial encounter           DISPOSITION  DISCHARGE    Patient discharged in stable condition.    Reviewed implications of results, diagnosis, meds, responsibility to follow up, warning signs and symptoms of possible worsening, potential complications and reasons to return to ER, including worsening symptoms    Patient/Family voiced understanding of above instructions.    Discussed plan for discharge, as there is no emergent indication for admission. Patient referred to primary care provider for BP management due to today's BP. Pt/family is agreeable and understands need for follow up and repeat testing.  Pt is aware that discharge does not mean that nothing is wrong but it  indicates no emergency is present that requires admission and they must continue care with follow-up as given below or physician of their choice.     FOLLOW-UP  Lindsey Chavez, APRN  [address] [place], [place] [id number] [phone]    Schedule an appointment as soon as possible for a visit            Medication List      New Prescriptions    ibuprofen 600 MG tablet  Commonly known as: ADVIL,MOTRIN  Take 1 tablet by mouth Every 8 (Eight) Hours As Needed for Mild Pain .        Changed    predniSONE 20 MG tablet  Commonly known as: DELTASONE  Take 2 tablets by mouth Daily.  What changed:   · medication strength  · how much to take        Stop    baclofen 10 MG tablet  Commonly known as: LIORESAL     chlorzoxazone 500 MG tablet  Commonly known as: PARAFON FORTE     cyclobenzaprine 10 MG tablet  Commonly known as: FLEXERIL     HYDROcodone-acetaminophen 5-325 MG per tablet  Commonly known as: NORCO     meloxicam 7.5 MG tablet  Commonly known as: MOBIC           Where to Get Your Medications      You can get these medications from any pharmacy    Bring a paper prescription for each of these medications  · ibuprofen 600 MG tablet  · predniSONE 20 MG tablet             Latest Documented Vital Signs:  As of 15:46 EDT  BP- (!) 137/104 HR- 66 Temp- 98.2 °F (36.8 °C) (Tympanic) O2 sat- 98%                           Chirag Waggoner MD  06/10/22 0786

## 2022-06-10 NOTE — ED TRIAGE NOTES
Pt has a herniated disc in her back.  She bent over an hour ago and felt a sharp pop and now she has back pain and can hardly walk    Patient was placed in face mask during first look triage.  Patient was wearing a face mask throughout encounter.  I wore personal protective equipment throughout the encounter.  Hand hygiene was performed before and after patient encounter.